# Patient Record
Sex: FEMALE | Race: WHITE | NOT HISPANIC OR LATINO | Employment: FULL TIME | URBAN - METROPOLITAN AREA
[De-identification: names, ages, dates, MRNs, and addresses within clinical notes are randomized per-mention and may not be internally consistent; named-entity substitution may affect disease eponyms.]

---

## 2019-01-22 PROBLEM — Z82.79 FAMILY HISTORY OF CONGENITAL HEART DEFECT: Status: ACTIVE | Noted: 2019-01-22

## 2019-02-14 PROBLEM — O47.9 UTERINE CONTRACTIONS DURING PREGNANCY: Status: ACTIVE | Noted: 2019-02-14

## 2019-05-07 PROBLEM — K35.30 ACUTE APPENDICITIS WITH LOCALIZED PERITONITIS, WITHOUT PERFORATION, ABSCESS, OR GANGRENE: Status: ACTIVE | Noted: 2019-05-07

## 2019-05-09 PROBLEM — K35.30 ACUTE APPENDICITIS WITH LOCALIZED PERITONITIS, WITHOUT PERFORATION, ABSCESS, OR GANGRENE: Status: RESOLVED | Noted: 2019-05-07 | Resolved: 2019-05-09

## 2021-06-09 PROBLEM — Z34.90 ENCOUNTER FOR INDUCTION OF LABOR: Status: ACTIVE | Noted: 2021-06-09

## 2023-05-26 ENCOUNTER — TELEPHONE (OUTPATIENT)
Dept: RHEUMATOLOGY | Facility: CLINIC | Age: 33
End: 2023-05-26
Payer: COMMERCIAL

## 2023-05-26 NOTE — TELEPHONE ENCOUNTER
----- Message from Tuyet Akers, Patient Care Assistant sent at 5/26/2023  2:01 PM CDT -----  Regarding: advice  Contact: Dr. gusman  Type: Needs Medical Advice    Who Called:  Dr. Gusman    Best Call Back Number:      Additional Information: Dr. Gusman states he would like a callback regarding an appointment concern. Please call to advise. Thanks!

## 2023-05-26 NOTE — TELEPHONE ENCOUNTER
Dr Zaman ask that patient be rescheduled to end of week (6/1/23) due to patient is seeing him 5/31/23 for more testing. Records being faxed to [facility].

## 2023-05-26 NOTE — TELEPHONE ENCOUNTER
Spoke to Dr Zaman and scheduled pt new patient appointment 5/29/23 12:00. Diagnosis Acute Iritis.  Pt confirmed appt   Records request from Dr Zaman.

## 2023-05-29 ENCOUNTER — TELEPHONE (OUTPATIENT)
Dept: RHEUMATOLOGY | Facility: CLINIC | Age: 33
End: 2023-05-29
Payer: COMMERCIAL

## 2023-05-29 NOTE — TELEPHONE ENCOUNTER
----- Message from Caprice Rm sent at 5/29/2023  8:25 AM CDT -----  Regarding: advice  Contact: Dr. Zaman  Type: Needs Medical Advice  Who Called:  Dr. Steve Zaman   Symptoms (please be specific):    How long has patient had these symptoms:    Pharmacy name and phone #:    Best Call Back Number: [phone]   Additional Information: Dr. Zaman stated that the fax number that he has is not going through for him to be able to send over information. Please contact to advise. Thanks!

## 2023-05-29 NOTE — TELEPHONE ENCOUNTER
Dr Zaman ask  that Dr Kaur reach out to discuss patient history.    Dr Kaur will contact Dr Zaman at [phone].

## 2023-05-31 NOTE — PROGRESS NOTES
"Subjective:      Patient ID: Lizzette Kumar is a 33 y.o. female.    Chief Complaint: Disease Management    HPI    Rheumatologic History:   - Diagnosis/es:   - Right iritis: 5 years ago, she took Z-stalin for a stomach infection, then developed some diarrhea. Subsequently, she developed an episode of iritis in the right eye after using "Lash Boost". It took it a while to resolve, but eventually did resolve with topical prednisolone.  On May 16th, she developed recurrent right eye pain and redness. She is now on prednisolone drops and is tapering.   - Family History: Colitis- mother  - Gyn History:  ()  - Positive serologies: HLA B27, MERVAT  - Negative serologies: -  - Infectious screening labs: -  - Imaging: -  - Previous Treatments:   - Prednisolone drops  - Current Treatments:   - Birth control: none, but no plans for pregnancy    Interval History:   She does have occasional thoracic back pain with exertion and when she stands for too long or sits for too long. It does not wake her up at night now, but did when she was pregnant. She denies fevers, patchy alopecia, oral/nasal ulcers, sicca symptoms, rashes, photosensitivity, joint pain/swelling, pleuritic chest pain, shortness of breath, cough, abdominal pain, diarrhea, bloody stool, weakness, numbness/tingling, and Raynaud's.     Objective:   /88   Pulse 77   Ht 5' 2.01" (1.575 m)   Wt 47 kg (103 lb 9.9 oz)   BMI 18.95 kg/m²   Physical Exam   Constitutional: normal appearance.   HENT:   Head: Normocephalic and atraumatic.   Cardiovascular: Normal rate, regular rhythm and normal heart sounds.   Pulmonary/Chest: Effort normal and breath sounds normal.   Musculoskeletal:      Comments: No synovitis, dactylitis, enthesitis, effusions  + Schober's: 3.5cm   Neurological: She is alert.   Skin: Skin is warm and dry. No rash noted.   No skin thickening, telangiectasias, calcinosis, psoriasiform lesions, lupoid lesions       No data to display    Labs reviewed " by me:  CBC (6/2021) WBC 17.82, Hgb 10.8, Plt WNL  CXR (5/2023) scoliosis, no acute disease      Assessment:     1. Recurrent iritis of right eye    2. HLA B27 (HLA B27 positive)    3. MERVAT positive    4. Chronic midline back pain, unspecified back location      This is a 33 year old woman with history of scoliosis, childhood asthma, and acute iritis of the right eye (2nd episode) on prednisolone drops. She reports some noninflammatory back pain but suspect this is more related to her scoliosis. However, due to HLA B27 positivity, will obtain an Xray of the SI joints and MRI of the SI joints. Dr Zaman told me that she has a positive MERVAT but I do not see this result, and do not find clinical features suggestive of lupus. Out of an abundance of caution, I will check lupus labs.     Plan:     Problem List Items Addressed This Visit    None  Visit Diagnoses       Recurrent iritis of right eye    -  Primary    Relevant Orders    X-Ray Sacroiliac Joints Complete    MRI Sacroiliac Joints Without Contrast    Anti-DNA Ab, Double-Stranded    Beta-2 Glycoprotein Abs (IgA, IgG, IgM)    C3 Complement    C4 Complement    Cardiolipin antibody    CBC Auto Differential    Comprehensive Metabolic Panel    C-Reactive Protein    DRVVT    HBcAB    Hepatitis B surface antibody    Hepatitis B surface antigen    Hepatitis C antibody    HLA B27 Antigen    Protein/Creatinine Ratio, Urine    Quantiferon Gold TB    Sedimentation rate    Urinalysis    HLA B27 (HLA B27 positive)        Relevant Orders    X-Ray Sacroiliac Joints Complete    MRI Sacroiliac Joints Without Contrast    Anti-DNA Ab, Double-Stranded    Beta-2 Glycoprotein Abs (IgA, IgG, IgM)    C3 Complement    C4 Complement    Cardiolipin antibody    CBC Auto Differential    Comprehensive Metabolic Panel    C-Reactive Protein    DRVVT    HBcAB    Hepatitis B surface antibody    Hepatitis B surface antigen    Hepatitis C antibody    HLA B27 Antigen    Protein/Creatinine Ratio, Urine     Quantiferon Gold TB    Sedimentation rate    Urinalysis    MERVAT positive        Relevant Orders    X-Ray Sacroiliac Joints Complete    MRI Sacroiliac Joints Without Contrast    Anti-DNA Ab, Double-Stranded    Beta-2 Glycoprotein Abs (IgA, IgG, IgM)    C3 Complement    C4 Complement    Cardiolipin antibody    CBC Auto Differential    Comprehensive Metabolic Panel    C-Reactive Protein    DRVVT    HBcAB    Hepatitis B surface antibody    Hepatitis B surface antigen    Hepatitis C antibody    HLA B27 Antigen    Protein/Creatinine Ratio, Urine    Quantiferon Gold TB    Sedimentation rate    Urinalysis    Chronic midline back pain, unspecified back location        Relevant Orders    X-Ray Sacroiliac Joints Complete    MRI Sacroiliac Joints Without Contrast          Follow up in 4-6 weeks to discuss results    60 minutes of total time spent on the encounter, which includes face to face time and non-face to face time preparing to see the patient (eg, review of tests), Obtaining and/or reviewing separately obtained history, Documenting clinical information in the electronic or other health record, Independently interpreting results (not separately reported) and communicating results to the patient/family/caregiver, or Care coordination (not separately reported).       Mia Ricketts M.D.  Rheumatology Dept  [place], [place]

## 2023-06-01 ENCOUNTER — OFFICE VISIT (OUTPATIENT)
Dept: RHEUMATOLOGY | Facility: CLINIC | Age: 33
End: 2023-06-01
Payer: COMMERCIAL

## 2023-06-01 ENCOUNTER — HOSPITAL ENCOUNTER (OUTPATIENT)
Dept: RADIOLOGY | Facility: HOSPITAL | Age: 33
Discharge: HOME | End: 2023-06-01
Attending: STUDENT IN AN ORGANIZED HEALTH CARE EDUCATION/TRAINING PROGRAM
Payer: COMMERCIAL

## 2023-06-01 VITALS
HEIGHT: 62 IN | SYSTOLIC BLOOD PRESSURE: 132 MMHG | BODY MASS INDEX: 19.07 KG/M2 | DIASTOLIC BLOOD PRESSURE: 88 MMHG | WEIGHT: 103.63 LBS | HEART RATE: 77 BPM

## 2023-06-01 DIAGNOSIS — M54.9 CHRONIC MIDLINE BACK PAIN, UNSPECIFIED BACK LOCATION: ICD-10-CM

## 2023-06-01 DIAGNOSIS — Z32.00 ENCOUNTER FOR PREGNANCY TEST, RESULT UNKNOWN: Primary | ICD-10-CM

## 2023-06-01 DIAGNOSIS — Z15.89 HLA B27 (HLA B27 POSITIVE): ICD-10-CM

## 2023-06-01 DIAGNOSIS — H20.021 RECURRENT IRITIS OF RIGHT EYE: ICD-10-CM

## 2023-06-01 DIAGNOSIS — H20.021 RECURRENT IRITIS OF RIGHT EYE: Primary | ICD-10-CM

## 2023-06-01 DIAGNOSIS — G89.29 CHRONIC MIDLINE BACK PAIN, UNSPECIFIED BACK LOCATION: ICD-10-CM

## 2023-06-01 DIAGNOSIS — R76.8 ANA POSITIVE: ICD-10-CM

## 2023-06-01 PROCEDURE — 3008F BODY MASS INDEX DOCD: CPT | Mod: CPTII,S$GLB,, | Performed by: STUDENT IN AN ORGANIZED HEALTH CARE EDUCATION/TRAINING PROGRAM

## 2023-06-01 PROCEDURE — 1160F RVW MEDS BY RX/DR IN RCRD: CPT | Mod: CPTII,S$GLB,, | Performed by: STUDENT IN AN ORGANIZED HEALTH CARE EDUCATION/TRAINING PROGRAM

## 2023-06-01 PROCEDURE — 3008F PR BODY MASS INDEX (BMI) DOCUMENTED: ICD-10-PCS | Mod: CPTII,S$GLB,, | Performed by: STUDENT IN AN ORGANIZED HEALTH CARE EDUCATION/TRAINING PROGRAM

## 2023-06-01 PROCEDURE — 99204 PR OFFICE/OUTPT VISIT, NEW, LEVL IV, 45-59 MIN: ICD-10-PCS | Mod: S$GLB,,, | Performed by: STUDENT IN AN ORGANIZED HEALTH CARE EDUCATION/TRAINING PROGRAM

## 2023-06-01 PROCEDURE — 1159F PR MEDICATION LIST DOCUMENTED IN MEDICAL RECORD: ICD-10-PCS | Mod: CPTII,S$GLB,, | Performed by: STUDENT IN AN ORGANIZED HEALTH CARE EDUCATION/TRAINING PROGRAM

## 2023-06-01 PROCEDURE — 99999 PR PBB SHADOW E&M-EST. PATIENT-LVL III: ICD-10-PCS | Mod: PBBFAC,,, | Performed by: STUDENT IN AN ORGANIZED HEALTH CARE EDUCATION/TRAINING PROGRAM

## 2023-06-01 PROCEDURE — 3079F PR MOST RECENT DIASTOLIC BLOOD PRESSURE 80-89 MM HG: ICD-10-PCS | Mod: CPTII,S$GLB,, | Performed by: STUDENT IN AN ORGANIZED HEALTH CARE EDUCATION/TRAINING PROGRAM

## 2023-06-01 PROCEDURE — 72202 X-RAY EXAM SI JOINTS 3/> VWS: CPT | Mod: TC,FY,PO

## 2023-06-01 PROCEDURE — 99204 OFFICE O/P NEW MOD 45 MIN: CPT | Mod: S$GLB,,, | Performed by: STUDENT IN AN ORGANIZED HEALTH CARE EDUCATION/TRAINING PROGRAM

## 2023-06-01 PROCEDURE — 3075F SYST BP GE 130 - 139MM HG: CPT | Mod: CPTII,S$GLB,, | Performed by: STUDENT IN AN ORGANIZED HEALTH CARE EDUCATION/TRAINING PROGRAM

## 2023-06-01 PROCEDURE — 1160F PR REVIEW ALL MEDS BY PRESCRIBER/CLIN PHARMACIST DOCUMENTED: ICD-10-PCS | Mod: CPTII,S$GLB,, | Performed by: STUDENT IN AN ORGANIZED HEALTH CARE EDUCATION/TRAINING PROGRAM

## 2023-06-01 PROCEDURE — 1159F MED LIST DOCD IN RCRD: CPT | Mod: CPTII,S$GLB,, | Performed by: STUDENT IN AN ORGANIZED HEALTH CARE EDUCATION/TRAINING PROGRAM

## 2023-06-01 PROCEDURE — 3075F PR MOST RECENT SYSTOLIC BLOOD PRESS GE 130-139MM HG: ICD-10-PCS | Mod: CPTII,S$GLB,, | Performed by: STUDENT IN AN ORGANIZED HEALTH CARE EDUCATION/TRAINING PROGRAM

## 2023-06-01 PROCEDURE — 72202 X-RAY EXAM SI JOINTS 3/> VWS: CPT | Mod: 26,,, | Performed by: RADIOLOGY

## 2023-06-01 PROCEDURE — 99999 PR PBB SHADOW E&M-EST. PATIENT-LVL III: CPT | Mod: PBBFAC,,, | Performed by: STUDENT IN AN ORGANIZED HEALTH CARE EDUCATION/TRAINING PROGRAM

## 2023-06-01 PROCEDURE — 72202 XR SACROILIAC JOINTS COMPLETE: ICD-10-PCS | Mod: 26,,, | Performed by: RADIOLOGY

## 2023-06-01 PROCEDURE — 3079F DIAST BP 80-89 MM HG: CPT | Mod: CPTII,S$GLB,, | Performed by: STUDENT IN AN ORGANIZED HEALTH CARE EDUCATION/TRAINING PROGRAM

## 2023-06-01 RX ORDER — PREDNISOLONE ACETATE 10 MG/ML
SUSPENSION/ DROPS OPHTHALMIC
COMMUNITY
Start: 2023-05-17

## 2023-06-09 ENCOUNTER — TELEPHONE (OUTPATIENT)
Dept: RHEUMATOLOGY | Facility: CLINIC | Age: 33
End: 2023-06-09
Payer: COMMERCIAL

## 2023-06-09 NOTE — TELEPHONE ENCOUNTER
[facility] labs received and reviewed (06/01/2023)  CBC WNL  CMP WNL  ESR 2   CRP <0.2  UA WNL  C3 and C4 WNL   Negative dsDNA, phosphatidylserine  Positive HLA B27  HBsAb reactive  HBsAg nonreactive   Hep C antibody nonreactive

## 2023-06-13 ENCOUNTER — HOSPITAL ENCOUNTER (OUTPATIENT)
Dept: RADIOLOGY | Facility: HOSPITAL | Age: 33
Discharge: HOME | End: 2023-06-13
Attending: STUDENT IN AN ORGANIZED HEALTH CARE EDUCATION/TRAINING PROGRAM
Payer: COMMERCIAL

## 2023-06-13 DIAGNOSIS — H20.021 RECURRENT IRITIS OF RIGHT EYE: ICD-10-CM

## 2023-06-13 DIAGNOSIS — M54.9 CHRONIC MIDLINE BACK PAIN, UNSPECIFIED BACK LOCATION: ICD-10-CM

## 2023-06-13 DIAGNOSIS — G89.29 CHRONIC MIDLINE BACK PAIN, UNSPECIFIED BACK LOCATION: ICD-10-CM

## 2023-06-13 DIAGNOSIS — Z15.89 HLA B27 (HLA B27 POSITIVE): ICD-10-CM

## 2023-06-13 DIAGNOSIS — R76.8 ANA POSITIVE: ICD-10-CM

## 2023-06-13 PROCEDURE — 72195 MRI SACROILIAC JOINTS WITHOUT CONTRAST: ICD-10-PCS | Mod: 26,,, | Performed by: RADIOLOGY

## 2023-06-13 PROCEDURE — 72195 MRI PELVIS W/O DYE: CPT | Mod: TC,PO

## 2023-06-13 PROCEDURE — 72195 MRI PELVIS W/O DYE: CPT | Mod: 26,,, | Performed by: RADIOLOGY

## 2023-06-30 NOTE — PROGRESS NOTES
"Subjective:      Patient ID: Lizzette Kumar is a 33 y.o. female.    Chief Complaint: Disease Management    HPI       Rheumatologic History:   - Diagnosis/es:              - Right iritis: 5 years ago, she took Z-stalin for a stomach infection, then developed some diarrhea. Subsequently, she developed an episode of iritis in the right eye after using "Lash Boost". It took it a while to resolve, but eventually did resolve with topical prednisolone.  On May 16th, she developed recurrent right eye pain and redness. She is now on prednisolone drops and is tapering.   - Family History: Colitis- mother  - Gyn History:  ()  - Positive serologies: HLA B27, MERVAT  - Negative serologies: -  - Infectious screening labs: Negative hepatitis B, C, and quantiferon (2023)  - Imaging:    - Xray SI joints (2023) no sacroiliitis   - MRI SI joints (2023): 1. No imaging evidence of acute sacroiliitis.  2. Grade I retrolisthesis of L5 on S1 with associated uncovering of the intervertebral disc with possible tiny annular fissure in the right paracentral portion of the L5-S1 intervertebral disc.  - Previous Treatments:              - Prednisolone drops  - Current Treatments:   - Birth control: none, possible upcoming plans for pregnancy  Interval History:   Her eye redness and pain has resolved but she does report headaches.    Objective:   /69   Pulse 62   Ht 5' 2.01" (1.575 m)   Wt 46.8 kg (103 lb 2.8 oz)   BMI 18.86 kg/m²   Physical Exam   Constitutional: normal appearance.   HENT:   Head: Normocephalic and atraumatic.   Pulmonary/Chest: Effort normal.   Musculoskeletal:      Comments: No synovitis, dactylitis, enthesitis, effusions     Neurological: She is alert.   Skin: Skin is warm and dry. No rash noted.   No skin thickening, telangiectasias, calcinosis, psoriasiform lesions, lupoid lesions       No data to display     [facility] labs received and reviewed (2023)  CBC WNL  CMP WNL  ESR 2   CRP <0.2  UA " WNL  C3 and C4 WNL   Negative dsDNA, phosphatidylserine  Positive HLA B27  HBsAb reactive  HBsAg nonreactive   Hep C antibody nonreactive   Quantiferon negative     Assessment:     1. Recurrent iritis of right eye    2. HLA B27 (HLA B27 positive)    3. High risk medication use    4. Immunosuppression      This is a 33 year old woman with history of scoliosis, childhood asthma, and HLA B27 positive recurrent non-infectious iritis without evidence of AS on Xray or MRI. She is not on birth control and has possible upcoming plans for pregnancy, therefore will have her start a TNF inhibitor.     Plan:     Problem List Items Addressed This Visit    None  Visit Diagnoses       Recurrent iritis of right eye    -  Primary    Relevant Medications    adalimumab (HUMIRA,CF, PEN) 40 mg/0.4 mL PnKt    Other Relevant Orders    CBC Auto Differential    Comprehensive Metabolic Panel    Sedimentation rate    C-Reactive Protein    HLA B27 (HLA B27 positive)        Relevant Medications    adalimumab (HUMIRA,CF, PEN) 40 mg/0.4 mL PnKt    Other Relevant Orders    CBC Auto Differential    Comprehensive Metabolic Panel    Sedimentation rate    C-Reactive Protein    High risk medication use        Relevant Orders    CBC Auto Differential    Comprehensive Metabolic Panel    Sedimentation rate    C-Reactive Protein    Immunosuppression        Relevant Orders    CBC Auto Differential    Comprehensive Metabolic Panel    Sedimentation rate    C-Reactive Protein          - Request PA for Humira biweekly. I discussed potential side effects including injection site reactions, malignancy, infection, blood count, liver and kidney function abnormalities.  Hold for infection. ACR patient information sheet on TNF inhibitors was provided.  - CBC, CMP, ESR CRP every 12 weeks  - Pre-DMARD labs due for repeat 6/2024  - Continue follow up with Ophthalmology     Follow up in 3 months    30 minutes of total time spent on the encounter, which includes face to  face time and non-face to face time preparing to see the patient (eg, review of tests), Obtaining and/or reviewing separately obtained history, Documenting clinical information in the electronic or other health record, Independently interpreting results (not separately reported) and communicating results to the patient/family/caregiver, or Care coordination (not separately reported).       Mia Ricketts M.D.  Rheumatology Dept  [place], [place]

## 2023-07-05 ENCOUNTER — OFFICE VISIT (OUTPATIENT)
Dept: RHEUMATOLOGY | Facility: CLINIC | Age: 33
End: 2023-07-05
Payer: COMMERCIAL

## 2023-07-05 ENCOUNTER — TELEPHONE (OUTPATIENT)
Dept: RHEUMATOLOGY | Facility: CLINIC | Age: 33
End: 2023-07-05

## 2023-07-05 VITALS
BODY MASS INDEX: 18.99 KG/M2 | WEIGHT: 103.19 LBS | DIASTOLIC BLOOD PRESSURE: 69 MMHG | HEIGHT: 62 IN | SYSTOLIC BLOOD PRESSURE: 114 MMHG | HEART RATE: 62 BPM

## 2023-07-05 DIAGNOSIS — H20.021 RECURRENT IRITIS OF RIGHT EYE: Primary | ICD-10-CM

## 2023-07-05 DIAGNOSIS — D84.9 IMMUNOSUPPRESSION: ICD-10-CM

## 2023-07-05 DIAGNOSIS — Z15.89 HLA B27 (HLA B27 POSITIVE): ICD-10-CM

## 2023-07-05 DIAGNOSIS — Z79.899 HIGH RISK MEDICATION USE: ICD-10-CM

## 2023-07-05 PROCEDURE — 99999 PR PBB SHADOW E&M-EST. PATIENT-LVL III: ICD-10-PCS | Mod: PBBFAC,,, | Performed by: STUDENT IN AN ORGANIZED HEALTH CARE EDUCATION/TRAINING PROGRAM

## 2023-07-05 PROCEDURE — 99215 OFFICE O/P EST HI 40 MIN: CPT | Mod: S$GLB,,, | Performed by: STUDENT IN AN ORGANIZED HEALTH CARE EDUCATION/TRAINING PROGRAM

## 2023-07-05 PROCEDURE — 1159F MED LIST DOCD IN RCRD: CPT | Mod: CPTII,S$GLB,, | Performed by: STUDENT IN AN ORGANIZED HEALTH CARE EDUCATION/TRAINING PROGRAM

## 2023-07-05 PROCEDURE — 3008F PR BODY MASS INDEX (BMI) DOCUMENTED: ICD-10-PCS | Mod: CPTII,S$GLB,, | Performed by: STUDENT IN AN ORGANIZED HEALTH CARE EDUCATION/TRAINING PROGRAM

## 2023-07-05 PROCEDURE — 3008F BODY MASS INDEX DOCD: CPT | Mod: CPTII,S$GLB,, | Performed by: STUDENT IN AN ORGANIZED HEALTH CARE EDUCATION/TRAINING PROGRAM

## 2023-07-05 PROCEDURE — 3074F SYST BP LT 130 MM HG: CPT | Mod: CPTII,S$GLB,, | Performed by: STUDENT IN AN ORGANIZED HEALTH CARE EDUCATION/TRAINING PROGRAM

## 2023-07-05 PROCEDURE — 99215 PR OFFICE/OUTPT VISIT, EST, LEVL V, 40-54 MIN: ICD-10-PCS | Mod: S$GLB,,, | Performed by: STUDENT IN AN ORGANIZED HEALTH CARE EDUCATION/TRAINING PROGRAM

## 2023-07-05 PROCEDURE — 3074F PR MOST RECENT SYSTOLIC BLOOD PRESSURE < 130 MM HG: ICD-10-PCS | Mod: CPTII,S$GLB,, | Performed by: STUDENT IN AN ORGANIZED HEALTH CARE EDUCATION/TRAINING PROGRAM

## 2023-07-05 PROCEDURE — 1159F PR MEDICATION LIST DOCUMENTED IN MEDICAL RECORD: ICD-10-PCS | Mod: CPTII,S$GLB,, | Performed by: STUDENT IN AN ORGANIZED HEALTH CARE EDUCATION/TRAINING PROGRAM

## 2023-07-05 PROCEDURE — 3078F DIAST BP <80 MM HG: CPT | Mod: CPTII,S$GLB,, | Performed by: STUDENT IN AN ORGANIZED HEALTH CARE EDUCATION/TRAINING PROGRAM

## 2023-07-05 PROCEDURE — 99999 PR PBB SHADOW E&M-EST. PATIENT-LVL III: CPT | Mod: PBBFAC,,, | Performed by: STUDENT IN AN ORGANIZED HEALTH CARE EDUCATION/TRAINING PROGRAM

## 2023-07-05 PROCEDURE — 1160F PR REVIEW ALL MEDS BY PRESCRIBER/CLIN PHARMACIST DOCUMENTED: ICD-10-PCS | Mod: CPTII,S$GLB,, | Performed by: STUDENT IN AN ORGANIZED HEALTH CARE EDUCATION/TRAINING PROGRAM

## 2023-07-05 PROCEDURE — 1160F RVW MEDS BY RX/DR IN RCRD: CPT | Mod: CPTII,S$GLB,, | Performed by: STUDENT IN AN ORGANIZED HEALTH CARE EDUCATION/TRAINING PROGRAM

## 2023-07-05 PROCEDURE — 3078F PR MOST RECENT DIASTOLIC BLOOD PRESSURE < 80 MM HG: ICD-10-PCS | Mod: CPTII,S$GLB,, | Performed by: STUDENT IN AN ORGANIZED HEALTH CARE EDUCATION/TRAINING PROGRAM

## 2023-07-05 RX ORDER — ADALIMUMAB 40MG/0.4ML
40 KIT SUBCUTANEOUS
Qty: 6 PEN | Refills: 1 | Status: SHIPPED | OUTPATIENT
Start: 2023-07-05 | End: 2023-07-15

## 2023-07-05 NOTE — TELEPHONE ENCOUNTER
----- Message from Lindsey Parikh, Patient Care Assistant sent at 7/5/2023  3:20 PM CDT -----  Contact: self  Pt is calling to speak w/ a nurse in regards to a medication [phone]  thanks

## 2023-07-15 DIAGNOSIS — Z15.89 HLA B27 (HLA B27 POSITIVE): ICD-10-CM

## 2023-07-15 DIAGNOSIS — H20.021 RECURRENT IRITIS OF RIGHT EYE: ICD-10-CM

## 2023-07-15 RX ORDER — ADALIMUMAB 40MG/0.4ML
KIT SUBCUTANEOUS
Qty: 6 PEN | Refills: 1 | Status: ACTIVE | OUTPATIENT
Start: 2023-07-15 | End: 2024-03-11

## 2023-07-17 ENCOUNTER — TELEPHONE (OUTPATIENT)
Dept: RHEUMATOLOGY | Facility: CLINIC | Age: 33
End: 2023-07-17
Payer: COMMERCIAL

## 2023-07-17 ENCOUNTER — TELEPHONE (OUTPATIENT)
Dept: PHARMACY | Facility: CLINIC | Age: 33
End: 2023-07-17
Payer: COMMERCIAL

## 2023-07-17 NOTE — TELEPHONE ENCOUNTER
Patient returned call to [facility] to report that she did not want to continue authorization for Humira. Will notify assigned pharmacist to close [facility] enrollment and inform provider.

## 2023-07-17 NOTE — TELEPHONE ENCOUNTER
Pt stated after speaking with ophthamologist, that she has decided not to take the humira. Pt asking if she needs to still follow up in November if she is not going to take the humira. Instructed pt that I would forwarded this info to Dr rueda for her to advise pt on treatment and follow up plan. Pt agreed and verbalized understanding.  JEWEL-LPN    ----- Message from Gerardo Landis sent at 7/17/2023  1:04 PM CDT -----  Contact: Self  Type:  Patient Returning Call    Who Called:  Patient  Who Left Message for Patient:  Nikhil  Does the patient know what this is regarding?:  Yes  Best Call Back Number:  [phone]   Additional Information:

## 2023-07-17 NOTE — TELEPHONE ENCOUNTER
Hello, this is Bishop Costello, clinical pharmacist with [facility] that is part of your care team.  We have begun working on your prescription that your doctor has sent us. Our next steps include:     Working with your insurance company to obtain approval for your medication  Working with you to ensure your medication is affordable     We will be calling you along the way with updates on your medication but if you have any concerns or receive information that you would like to discuss please reach us at [phone].    Welcome call outcome: Left voicemail